# Patient Record
Sex: MALE | Race: BLACK OR AFRICAN AMERICAN | NOT HISPANIC OR LATINO | URBAN - METROPOLITAN AREA
[De-identification: names, ages, dates, MRNs, and addresses within clinical notes are randomized per-mention and may not be internally consistent; named-entity substitution may affect disease eponyms.]

---

## 2023-01-29 ENCOUNTER — HOSPITAL ENCOUNTER (EMERGENCY)
Facility: OTHER | Age: 33
Discharge: HOME | End: 2023-01-29
Attending: EMERGENCY MEDICINE

## 2023-01-29 VITALS
RESPIRATION RATE: 20 BRPM | BODY MASS INDEX: 22.09 KG/M2 | HEART RATE: 89 BPM | TEMPERATURE: 100 F | OXYGEN SATURATION: 97 % | HEIGHT: 70 IN | DIASTOLIC BLOOD PRESSURE: 71 MMHG | WEIGHT: 154.31 LBS | SYSTOLIC BLOOD PRESSURE: 109 MMHG

## 2023-01-29 DIAGNOSIS — R05.9 COUGH: ICD-10-CM

## 2023-01-29 DIAGNOSIS — Z87.09 PERSONAL HISTORY OF ASTHMA: Primary | ICD-10-CM

## 2023-01-29 DIAGNOSIS — J06.9 VIRAL URI WITH COUGH: ICD-10-CM

## 2023-01-29 LAB
CTP QC/QA: YES
CTP QC/QA: YES
HCV AB SERPL QL IA: NEGATIVE
HIV 1+2 AB+HIV1 P24 AG SERPL QL IA: NEGATIVE
POC MOLECULAR INFLUENZA A AGN: NEGATIVE
POC MOLECULAR INFLUENZA B AGN: NEGATIVE
SARS-COV-2 RDRP RESP QL NAA+PROBE: NEGATIVE

## 2023-01-29 PROCEDURE — 87635 SARS-COV-2 COVID-19 AMP PRB: CPT | Performed by: NURSE PRACTITIONER

## 2023-01-29 PROCEDURE — 99284 EMERGENCY DEPT VISIT MOD MDM: CPT | Mod: 25

## 2023-01-29 PROCEDURE — 25000003 PHARM REV CODE 250: Performed by: NURSE PRACTITIONER

## 2023-01-29 PROCEDURE — 86803 HEPATITIS C AB TEST: CPT | Performed by: EMERGENCY MEDICINE

## 2023-01-29 PROCEDURE — 87389 HIV-1 AG W/HIV-1&-2 AB AG IA: CPT | Performed by: EMERGENCY MEDICINE

## 2023-01-29 RX ORDER — LEVALBUTEROL TARTRATE 45 UG/1
1-2 AEROSOL, METERED ORAL EVERY 4 HOURS PRN
Qty: 15 G | Refills: 0 | Status: SHIPPED | OUTPATIENT
Start: 2023-01-29 | End: 2024-01-29

## 2023-01-29 RX ORDER — BENZONATATE 100 MG/1
100 CAPSULE ORAL 3 TIMES DAILY PRN
Qty: 20 CAPSULE | Refills: 0 | Status: SHIPPED | OUTPATIENT
Start: 2023-01-29 | End: 2023-02-08

## 2023-01-29 RX ORDER — ACETAMINOPHEN 325 MG/1
650 TABLET ORAL
Status: COMPLETED | OUTPATIENT
Start: 2023-01-29 | End: 2023-01-29

## 2023-01-29 RX ORDER — IBUPROFEN 600 MG/1
600 TABLET ORAL EVERY 6 HOURS PRN
Qty: 20 TABLET | Refills: 0 | Status: SHIPPED | OUTPATIENT
Start: 2023-01-29

## 2023-01-29 RX ORDER — PROMETHAZINE HYDROCHLORIDE AND DEXTROMETHORPHAN HYDROBROMIDE 6.25; 15 MG/5ML; MG/5ML
5 SYRUP ORAL EVERY 4 HOURS PRN
Qty: 118 ML | Refills: 0 | Status: SHIPPED | OUTPATIENT
Start: 2023-01-29 | End: 2023-02-08

## 2023-01-29 RX ORDER — IBUPROFEN 600 MG/1
600 TABLET ORAL
Status: COMPLETED | OUTPATIENT
Start: 2023-01-29 | End: 2023-01-29

## 2023-01-29 RX ADMIN — ACETAMINOPHEN 650 MG: 325 TABLET, FILM COATED ORAL at 08:01

## 2023-01-29 RX ADMIN — IBUPROFEN 600 MG: 600 TABLET, FILM COATED ORAL at 07:01

## 2023-01-29 NOTE — Clinical Note
"Cuco Torres (Alejandro)esiakirti Badillo was seen and treated in our emergency department on 1/29/2023.  He may return to work on 02/01/2023.       If you have any questions or concerns, please don't hesitate to call.      Estrella Finley NP"

## 2023-01-30 NOTE — FIRST PROVIDER EVALUATION
" Emergency Department TeleTriage Encounter Note      CHIEF COMPLAINT    Chief Complaint   Patient presents with    Cough     Pt's states " i'm asthmatic" reports cough since 2 days ago, worsening symptoms, reports productive cough, chest congestion, denies fever/chills, reports pain to kidneys with coughing        VITAL SIGNS   Initial Vitals [01/29/23 1840]   BP Pulse Resp Temp SpO2   109/71 89 20 100.1 °F (37.8 °C) 97 %      MAP       --            ALLERGIES    Review of patient's allergies indicates:  No Known Allergies    PROVIDER TRIAGE NOTE  This is a teletriage evaluation of a 32 y.o. male presenting to the ED complaining of worsening cough for two days. Denies fever.  Reports pmhx of asthma but states that his inhaler gives him tachycardia and he feels worse.      Pt speaking in full sentences.  Will start with PO motrin and COVID and flu tests.     Initial orders will be placed and care will be transferred to an alternate provider when patient is roomed for a full evaluation. Any additional orders and the final disposition will be determined by that provider.         ORDERS  Labs Reviewed   HIV 1 / 2 ANTIBODY   HEPATITIS C ANTIBODY       ED Orders (720h ago, onward)      Start Ordered     Status Ordering Provider    01/29/23 1848 01/29/23 1847  POCT COVID-19 Rapid Screening  Once         Ordered COOPER CASH NStacy    01/29/23 1848 01/29/23 1847  POCT Influenza A/B Molecular  Once         Ordered COOPER CASH NStacy    01/29/23 1844 01/29/23 1843  HIV 1/2 Ag/Ab (4th Gen)  STAT         Ordered GERRY ONEILL    01/29/23 1844 01/29/23 1843  Hepatitis C Antibody  STAT         Ordered GERRY ONEILL              Virtual Visit Note: The provider triage portion of this emergency department evaluation and documentation was performed via [employer], a HIPAA-compliant telemedicine application, in concert with a tele-presenter in the room. A face to face patient evaluation with one of my colleagues " will occur once the patient is placed in an emergency department room.      DISCLAIMER: This note was prepared with [employer] voice recognition transcription software. Garbled syntax, mangled pronouns, and other bizarre constructions may be attributed to that software system.

## 2023-01-30 NOTE — ED PROVIDER NOTES
"     Source of History:  Patient    Chief complaint:  Cough (Pt's states " i'm asthmatic" reports cough since 2 days ago, worsening symptoms, reports productive cough, chest congestion, denies fever/chills, reports pain to kidneys with coughing )      HPI:  Cuco Badillo is a 32 y.o. male presenting with productive cough for the past 2 days associated with chest discomfort only when coughing, and feeling of chest congestion.  Patient reports a history of asthma but states that he does not have an inhaler because it gives him tachycardia.  He reports overall feeling unwell and intermittent fever and chills and states that he wanted to be evaluated.  No shortness of breath.    This is the extent to the patients complaints today here in the emergency department.    ROS: As per HPI and below:  General: No fever.  No chills.  Eyes: No visual changes.  ENT: No sore throat. No ear pain  Head: No headache.    Chest: No shortness of breath. +cough  Cardiovascular: No chest pain.  Abdomen: No abdominal pain.  No nausea or vomiting.  Genito-Urinary: No abnormal urination.  Neurologic: No focal weakness.  No numbness.  MSK: no back pain.  Integument: No rashes or lesions.  Hematologic: No easy bruising.  Endocrine: No excessive thirst or urination.    Review of patient's allergies indicates:  No Known Allergies    PMH:  As per HPI and below:  No past medical history on file.  No past surgical history on file.         Physical Exam:    /71 (BP Location: Right arm, Patient Position: Sitting)   Pulse 89   Temp 100.1 °F (37.8 °C) (Oral)   Resp 20   Ht 5' 10" (1.778 m)   Wt 70 kg (154 lb 5.2 oz)   SpO2 97%   BMI 22.14 kg/m²   Nursing note and vital signs reviewed.  Appearance: No acute distress.  Eyes: No conjunctival injection.  ENT: Oropharynx clear.    Chest/ Respiratory: Clear to auscultation bilaterally.  Good air movement.  No wheezes.  No rhonchi. No rales. No accessory muscle " use.  Cardiovascular: Regular rate and rhythm.  No murmurs. No gallops. No rubs.  Abdomen: Soft.  Not distended.  Nontender.  No guarding.  No rebound. Non-peritoneal.  Musculoskeletal: Good range of motion all joints.  No deformities.  Neck supple.  No meningismus.  Skin: No rashes seen.  Good turgor.  No abrasions.  No ecchymoses.  Neurologic: Motor intact.  Sensation intact.  Cerebellar intact.  Cranial nerves intact.  Mental Status:  Alert and oriented x 3.  Appropriate, conversant    Labs that have been ordered have been independently reviewed and interpreted by myself.        Labs Reviewed   HIV 1 / 2 ANTIBODY    Narrative:     Release to patient->Immediate   HEPATITIS C ANTIBODY    Narrative:     Release to patient->Immediate   SARS-COV-2 RDRP GENE   POCT INFLUENZA A/B MOLECULAR       Imaging Results              X-Ray Chest PA And Lateral (Final result)  Result time 01/29/23 20:41:00      Final result by Ro Martell MD (01/29/23 20:41:00)                   Impression:      No acute cardiopulmonary process identified.      Electronically signed by: Ro Martell MD  Date:    01/29/2023  Time:    20:41               Narrative:    EXAMINATION:  XR CHEST PA AND LATERAL    CLINICAL HISTORY:  Cough, unspecified    TECHNIQUE:  PA and lateral views of the chest were performed.    COMPARISON:  None    FINDINGS:  Cardiac silhouette is normal in size.  Lungs are symmetrically expanded.  No evidence of focal consolidative process, pneumothorax, or significant pleural effusion.  No acute osseous abnormality identified.                                      Initial Impression/ Differential Dx:  Urgent evaluation of 32 y.o. male presenting with 2 day history of cough. Patient has a low-grade temperature but is not toxic appearing.  He is hemodynamically stable.  Physical exam is grossly unremarkable.  COVID and flu swabs ordered from tele triage.  No wheezing heard upon auscultation, however given history of asthma  will discharge home with a Xopenex inhaler as it will not elevate his heart rate. Will also get CXR. Considered PE but PERC negative.  I do not feel like labs are indicated at this time.    Differential Diagnosis includes, but is not limited to:  Viral URI, Strep pharyngitis, viral pharyngitis, foreign body aspiration/ingestion, bronchitis, asthma exacerbation, CHF exacerbation, COPD exacerbation, allergy/atopy, influenza, pertussis, PE, pneumonia, lung abscess, fungal infection, TB, epiglottitis.      MDM:        ED Course as of 01/30/23 2141   Sun Jan 29, 2023 1948 SARS-CoV-2 RNA, Amplification, Qual: Negative [CU]   1948 POC Molecular Influenza A Ag: Negative [CU]   1948 POC Molecular Influenza B Ag: Negative [CU]   2047 X-Ray Chest PA And Lateral  No acute cardiopulmonary process identified. [CU]   2047 Given the patient's history and physical exam findings, signs and symptoms are most consistent with a viral etiology.  No indication of pneumonia or other acute process that would require antibiotic treatment at this time. Patient was counseled on home care and supportive treatment.  Patient discharged home in stable condition with instructions to obtain close follow up with PCP. Patient educated on signs and symptoms to monitor for and when to return to ED. Patient verbalized understanding agrees with treatment plan. All questions and concerns addressed.          [CU]      ED Course User Index  [CU] Estrella Finley NP                   Diagnostic Impression:    1. Personal history of asthma    2. Cough    3. Viral URI with cough         ED Disposition Condition    Discharge Good            ED Prescriptions       Medication Sig Dispense Start Date End Date Auth. Provider    levalbuterol (XOPENEX HFA) 45 mcg/actuation inhaler Inhale 1-2 puffs into the lungs every 4 (four) hours as needed for Wheezing. Rescue 15 g 1/29/2023 1/29/2024 Estrella Finley NP    benzonatate (TESSALON) 100 MG capsule Take 1 capsule  (100 mg total) by mouth 3 (three) times daily as needed for Cough. 20 capsule 1/29/2023 2/8/2023 Estrella Finley NP    ibuprofen (ADVIL,MOTRIN) 600 MG tablet Take 1 tablet (600 mg total) by mouth every 6 (six) hours as needed for Pain. 20 tablet 1/29/2023 -- Estrella Finley NP    promethazine-dextromethorphan (PROMETHAZINE-DM) 6.25-15 mg/5 mL Syrp Take 5 mLs by mouth every 4 (four) hours as needed. 118 mL 1/29/2023 2/8/2023 Estrella Finley NP          Follow-up Information       Follow up With Specialties Details Why Contact Info    [facility]    [address] [id number]             Estrella Finley NP  01/30/23 2637

## 2023-01-30 NOTE — ED TRIAGE NOTES
Pt to ER with complaint of flu like symptoms for two days. Pt reports a productive cough, general malaise, subjective fever, nasal congestion and bilateral rib pain with cough. Pt breathing is equal and unlabored. Pt in no acute distress with chest noted to equal rise and fall. Pt AAOx4.